# Patient Record
Sex: FEMALE | Race: WHITE | Employment: FULL TIME | URBAN - METROPOLITAN AREA
[De-identification: names, ages, dates, MRNs, and addresses within clinical notes are randomized per-mention and may not be internally consistent; named-entity substitution may affect disease eponyms.]

---

## 2021-11-23 ENCOUNTER — HOSPITAL ENCOUNTER (OUTPATIENT)
Dept: PHYSICAL THERAPY | Age: 51
Discharge: HOME | End: 2021-11-23
Payer: COMMERCIAL

## 2021-11-23 ENCOUNTER — APPOINTMENT (OUTPATIENT)
Dept: PHYSICAL THERAPY | Age: 51
End: 2021-11-23

## 2021-11-23 PROCEDURE — 97140 MANUAL THERAPY 1/> REGIONS: CPT

## 2021-11-23 PROCEDURE — 97161 PT EVAL LOW COMPLEX 20 MIN: CPT

## 2021-11-23 PROCEDURE — 97110 THERAPEUTIC EXERCISES: CPT

## 2021-11-23 NOTE — PROGRESS NOTES
PHYSICAL THERAPY - DAILY TREATMENT NOTE     Patient Name: Devin Hernandez        Date: 2021  : 1970   YES Patient  Verified  Visit #:     Insurance: Payor: Emmie Fortune / Plan: Roly Gibbs / Product Type: HMO /      In time: 5:15 PM Out time: 6:25 PM   Total Treatment Time: 70 min. Medicare/[employer] Time Tracking (below)   Total Timed Codes (min):  25 1:1 Treatment Time:  23     TREATMENT AREA =  Left shoulder pain [M25.512]    SUBJECTIVE    Pain Level (on 0 to 10 scale):  3  / 10   Medication Changes/New allergies or changes in medical history, any new surgeries or procedures? NO    If yes, update Summary List   Subjective Functional Status/Changes:  []  No changes reported     See POC         OBJECTIVE  Modalities Rationale:     decrease edema, decrease inflammation and decrease pain to improve patient's ability to reach, perform ADLs, household chores, sleep.   min [] Estim, type/location:                                      []  att     []  unatt     []  w/US     []  w/ice    []  w/heat    min []  Mechanical Traction: type/lbs                   []  pro   []  sup   []  int   []  cont    []  before manual    []  after manual    min []  Ultrasound, settings/location:      min []  Iontophoresis w/ dexamethasone, location:                                               []  take home patch       []  in clinic   10 min [x]  Ice     []  Heat    location/position: Left shoulder post-Tx/seated with L UE supported. Handouts for HEP prepared while CP in place.     min []  Vasopneumatic Device, press/temp:     min []  Other:    [x] Skin assessment post-treatment (if applicable):    [x]  intact    []  redness- no adverse reaction     []redness - adverse reaction:      17 min Therapeutic Exercise:  [x]  See flow sheet   Rationale:      increase ROM and increase proprioception to improve the patients ability to reach, perform ADLs, household chores, sleep.    8 min Manual Therapy: Technique: [] S/DTM []IASTM [x]PROM [x] Passive Stretching   []manual TPR    [x]Jt manipulation:Gr I [] II [x]  III [x] IV[] V[]  Treatment Area:  Left shoulder in supine--gentle GH joint lateral distractions and joint mobs for inf, ant, and post glides; gentle PROM for L shldr flex and for ER and IR at ~45 deg scaption. Rationale:      decrease pain, increase ROM and increase tissue extensibility to improve patient's ability to reach, perform ADLs, household chores, sleep. The manual therapy interventions were performed at a separate and distinct time from the therapeutic activities interventions. Billed With/As:   [x] TE   [] TA   [] Neuro   [x] Self Care Patient Education: [x] Review HEP    [x] Progressed/Changed HEP based on:   [] positioning   [] body mechanics   [] transfers   [] heat/ice application    [x] other:  Patient instructed in, PT demonstrated, and patient briefly performed beginning HEP. Patient given handouts with pictures and written directions for HEP and copies placed in patient's chart (prepared while CP to L shldr). Other Objective/Functional Measures:    See POC     Post Treatment Pain Level (on 0 to 10) scale:   ? / 10--Not Reassessed. ASSESSMENT    Assessment/Changes in Function:     See POC     []  See Progress Note/Recertification   Patient will continue to benefit from skilled PT services to modify and progress therapeutic interventions, address functional mobility deficits, address ROM deficits, analyze and address soft tissue restrictions, analyze and cue movement patterns, analyze and modify body mechanics/ergonomics, assess and modify postural abnormalities and instruct in home and community integration to attain remaining goals.       Progress toward goals / Updated goals:    See POC       PLAN    [x]  Upgrade activities as tolerated YES Continue plan of care   []  Discharge due to :    []  Other:      Therapist: Krystin Ireland PT    Date: 11/23/2021 Time: 6:45 PM     Future Appointments   Date Time Provider Tiffanie Godwin   12/2/2021  5:15 PM James Dugan, PT [facility]   12/6/2021  5:15 PM Ligia Berumen, PT [facility]   12/9/2021  5:15 PM James Dugan, PT [facility]   12/13/2021  4:30 PM Ligia Berumen, PT [facility]   12/16/2021  5:15 PM James Dugan, PT [facility]   12/20/2021  5:15 PM Ligia Berumen, PT [facility]   12/23/2021  5:15 PM James Dugan, PT [facility]   12/27/2021  5:15 PM Ligia Berumen, PT [facility]   12/30/2021  5:15 PM James Dugan, PT [facility]
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
## 2021-12-02 ENCOUNTER — APPOINTMENT (OUTPATIENT)
Dept: PHYSICAL THERAPY | Age: 51
End: 2021-12-02
Payer: COMMERCIAL

## 2021-12-06 ENCOUNTER — APPOINTMENT (OUTPATIENT)
Dept: PHYSICAL THERAPY | Age: 51
End: 2021-12-06
Payer: COMMERCIAL

## 2021-12-09 ENCOUNTER — HOSPITAL ENCOUNTER (OUTPATIENT)
Dept: PHYSICAL THERAPY | Age: 51
Discharge: HOME | End: 2021-12-09
Payer: COMMERCIAL

## 2021-12-09 PROCEDURE — 97140 MANUAL THERAPY 1/> REGIONS: CPT

## 2021-12-09 PROCEDURE — 97110 THERAPEUTIC EXERCISES: CPT

## 2021-12-09 NOTE — PROGRESS NOTES
PHYSICAL THERAPY - DAILY TREATMENT NOTE     Patient Name: Florin Gomes        Date: 2021  : 1970   YES Patient  Verified  Visit #:   2   of   18  Insurance: Payor: [employer] / Plan: Algis Bamberger / Product Type: HMO /      In time: 5:12 PM Out time: 6:15 PM   Total Treatment Time: 62 min. Medicare/[employer] Time Tracking (below)   Total Timed Codes (min):  60 1:1 Treatment Time:  40 min. TREATMENT AREA =  Left shoulder pain [M25.512]    SUBJECTIVE    Pain Level (on 0 to 10 scale):  0  / 10   Medication Changes/New allergies or changes in medical history, any new surgeries or procedures? NO    If yes, update Summary List   Subjective Functional Status/Changes:  []  No changes reported     Pt relates that she has been doing her HEP and she has nor problems or questions related to it. She feels that maybe her L UE is able to move a bit more maybe. Pt recently did a course of oral steroids for LBP and that seems to have helped her L shldr pain as well; her lower back is OK now. OBJECTIVE    40 min Therapeutic Exercise:  [x]  See flow sheet   Rationale:      increase ROM, increase strength and increase proprioception to improve the patients ability to reach, perform ADLs, household chores, sleep. 10 min Manual Therapy: Technique:      [] S/DTM []IASTM [x]PROM [x] Passive Stretching   []manual TPR    [x]Jt manipulation:Gr I [] II []  III [x] IV[x] V[]  Treatment Area:  Left shoulder in supine--GH joint mobs for lateral distractions and for humeral head glides inferior, anterior, and posterior. Rationale:      decrease pain, increase ROM, increase tissue extensibility, decrease trigger points and increase postural awareness to improve patient's ability to reach, perform ADLs, household chores, sleep. The manual therapy interventions were performed at a separate and distinct time from the therapeutic activities interventions.     Billed With/As:   [] TE   [] TA   [] Neuro   [] Self Care Patient Education: [x] Review HEP    [] Progressed/Changed HEP based on:   [] positioning   [x] body mechanics   [] transfers   [x] heat/ice application    [] other:        Other Objective/Functional Measures:    Increased L shldr flexion and aBd/scaption ROM noted with pulley use. Increased L shldr ER (~60 deg scaption) with wand in supine, to about 40 degrees, but discomfort. Post Treatment Pain Level (on 0 to 10) scale:   3  / 10     ASSESSMENT    Assessment/Changes in Function:     Across and up behind back reach still very limited and discomfort. Left shoulder bothered by self-PROM for shldr flexion with pulley in standing, maybe too many reps and/or to forceful. []  See Progress Note/Recertification   Patient will continue to benefit from skilled PT services to modify and progress therapeutic interventions, address functional mobility deficits, address ROM deficits, address strength deficits, analyze and address soft tissue restrictions, analyze and cue movement patterns, analyze and modify body mechanics/ergonomics, assess and modify postural abnormalities and instruct in home and community integration to attain remaining goals. Progress toward goals / Updated goals:    Fair Progress to    [x] STG    [] LTG  1, 3, and 4 as shown by patient report and observed performance of TE and of motion during PT session.        PLAN    [x]  Upgrade activities as tolerated YES Continue plan of care   []  Discharge due to :    []  Other:      Therapist: Pascale Tolbert PT    Date: 12/9/2021 Time: 1:50 PM     Future Appointments   Date Time Provider Tiffanie Godwin   12/9/2021  5:15 PM Guerry Patient, PT [facility]   12/16/2021  5:15 PM Guerry Patient, PT [facility]   12/23/2021  5:15 PM Guerry Patient, PT [facility]   12/30/2021  5:15 PM Guerry Patient, PT [facility]

## 2021-12-13 ENCOUNTER — APPOINTMENT (OUTPATIENT)
Dept: PHYSICAL THERAPY | Age: 51
End: 2021-12-13
Payer: COMMERCIAL

## 2021-12-16 ENCOUNTER — APPOINTMENT (OUTPATIENT)
Dept: PHYSICAL THERAPY | Age: 51
End: 2021-12-16
Payer: COMMERCIAL

## 2021-12-20 ENCOUNTER — APPOINTMENT (OUTPATIENT)
Dept: PHYSICAL THERAPY | Age: 51
End: 2021-12-20
Payer: COMMERCIAL

## 2021-12-23 ENCOUNTER — APPOINTMENT (OUTPATIENT)
Dept: PHYSICAL THERAPY | Age: 51
End: 2021-12-23
Payer: COMMERCIAL

## 2021-12-27 ENCOUNTER — APPOINTMENT (OUTPATIENT)
Dept: PHYSICAL THERAPY | Age: 51
End: 2021-12-27
Payer: COMMERCIAL

## 2021-12-30 ENCOUNTER — APPOINTMENT (OUTPATIENT)
Dept: PHYSICAL THERAPY | Age: 51
End: 2021-12-30
Payer: COMMERCIAL

## 2022-01-06 NOTE — PROGRESS NOTES
[address] [facility] PHYSICAL THERAPY AT [address] [facility], [address] [place] [address], [address], [address] [place] [address]  Phone: [phone]  Fax: [phone] SUMMARY  Patient Name: Inder Heart : 1970   Treatment/Medical Diagnosis: Left shoulder pain [M25.512]   Referral Source: Lucas Wade NP     Date of Initial Visit: 2021 Attended Visits: 2 Missed Visits: 1     SUMMARY OF TREATMENT  Patient had PT evaluation and instruction in beginning HEP on 2021 and attended one follow-up treatment session on 2021, which consisted of MHP to L shoulder, manual therapy for L shldr GH joint mobilizations and PROM, review/performance of HEP, and performance of therapeutic exercises/activities to increase L shoulder ROM. Pt had an appointment scheduled for 2021, but called to cancel on the day of her appointment. Patient has not had any further contact with our office and her status is unkonw. CURRENT STATUS/ GOAL STATUS  Unable to further assess progress towards goals at this time due to non-compliance/lack of attendance. DC at this time with no further instructions to the patient. Thank you for this referral.    RECOMMENDATIONS  Discontinue therapy due to lack of attendance or compliance. If you have any questions/comments please contact us directly at [phone]. Thank you for allowing us to assist in the care of your patient.     Therapist Signature: James Rondon PT Date: 2022   Reporting Period:   2021 to 2022 Time: 1:48 PM